# Patient Record
(demographics unavailable — no encounter records)

---

## 2025-05-01 NOTE — HISTORY OF PRESENT ILLNESS
[3] : 3 [Meds] : meds [de-identified] : 5.1.25 PATIENT HERE FOR LEFT HIP PAIN. PATIENT STATES PAIN STARTED A WEEK AGO. PATIENT STATES SHE WAS WEARING HEELS AND TOOK A WRONG STEP.

## 2025-05-01 NOTE — DISCUSSION/SUMMARY
[de-identified] : I, Bethany Nolasco, am scribing for Dr. Morrow in his presence for the chief complaint, physical exam, studies, assessment, and/or plan.

## 2025-05-01 NOTE — IMAGING
[Left] : left hip with pelvis [All Views] : anteroposterior, lateral [Dysplasia] : Dysplasia [FreeTextEntry9] : ACETABULAR CYSTS

## 2025-05-01 NOTE — ASSESSMENT
[FreeTextEntry1] : 51 year F WITH MODERATE LT HIP PAIN SINCE THE END OF APRIL 2025 WHEN SHE TOOK A WRONG STEP WHILE WEARING HEELS. PAIN IS IN THE GROIN AND DOES NOT RADIATE. PAIN WORSENS WITH WALKING PROLONGED DISTANCES. PAIN IS AFFECTING ADL AND FUNCTIONAL ACTIVITIES, PUTTING ON SHOES AND SOCKS. XRAYS REVIEWED WITH ACETABULAR CYSTS, DYSPLASIA. TREATMENT OPTIONS REVIEWED. QUESTIONS ANSWERED.   WILL ORDER LT HIP MRI TO EVAL FOR OA, LABRAL TEAR  MEDICATION USE DISCUSSED. PRESCRIBED DICLOFENAC 75MG TO BE TAKEN BID AS NEEDED FOR PAIN, WITH CAUTION TO USE AND SIDE EFFECTS.

## 2025-05-22 NOTE — HISTORY OF PRESENT ILLNESS
[3] : 3 [Meds] : meds [de-identified] : 5.1.25 PATIENT HERE FOR LEFT HIP PAIN. PATIENT STATES PAIN STARTED A WEEK AGO. PATIENT STATES SHE WAS WEARING HEELS AND TOOK A WRONG STEP.  5.22.25 PATIENT IS HERE FOR MRI DISUCSSION OF LEFT HIP

## 2025-05-22 NOTE — ASSESSMENT
[FreeTextEntry1] : 51 year F WITH MODERATE LT HIP PAIN SINCE THE END OF APRIL 2025 WHEN SHE TOOK A WRONG STEP WHILE WEARING HEELS. PAIN IS IN THE GROIN AND DOES NOT RADIATE. PAIN WORSENS WITH WALKING PROLONGED DISTANCES. PAIN IS AFFECTING ADL AND FUNCTIONAL ACTIVITIES, PUTTING ON SHOES AND SOCKS. XRAYS REVIEWED WITH ACETABULAR CYSTS, DYSPLASIA. TREATMENT OPTIONS REVIEWED. QUESTIONS ANSWERED.   LT HIP MRI [facility] 5/8/25 INDEPENDENTLY REVIEWED WITH MODERATE OA, DYSPLASIA. ALSO WITH RIGHT HIP MILD OA AND DYSPLASIA.   MEDICATION USE DISCUSSED. DISCUSSED CONTINUING DICLOFENAC 75MG TO BE TAKEN BID AS NEEDED FOR PAIN, WITH CAUTION TO USE AND SIDE EFFECTS.  WILL SCHEDULE LT HIP INJECTION UNDER FLUORO. The risks, benefits, and alternatives to cortisone injection were explained in full to the patient. Risks outlined include but are not limited to infection, sepsis, bleeding, scarring, skin discoloration, temporary increase in pain, syncopal episode, failure to resolve symptoms, allergic reaction, symptom recurrence, and elevation of blood sugar in diabetics. Patient understood the risks. All questions were answered. After discussion of options, patient requested an injection. Oral informed consent was obtained. Post Procedure Instructions: Patient was advised to call if redness, pain, or fever occur.

## 2025-05-22 NOTE — DISCUSSION/SUMMARY
[de-identified] : I, Bethany Nolasco, am scribing for Dr. Morrow in his presence for the chief complaint, physical exam, studies, assessment, and/or plan.
